# Patient Record
Sex: FEMALE | Race: WHITE
[De-identification: names, ages, dates, MRNs, and addresses within clinical notes are randomized per-mention and may not be internally consistent; named-entity substitution may affect disease eponyms.]

---

## 2018-02-01 ENCOUNTER — HOSPITAL ENCOUNTER (OUTPATIENT)
Dept: HOSPITAL 17 - HDIC | Age: 78
Discharge: HOME | End: 2018-02-01
Attending: INTERNAL MEDICINE
Payer: MEDICARE

## 2018-02-01 VITALS — WEIGHT: 163.8 LBS | HEIGHT: 63 IN | BODY MASS INDEX: 29.02 KG/M2

## 2018-02-01 VITALS
HEART RATE: 52 BPM | DIASTOLIC BLOOD PRESSURE: 63 MMHG | RESPIRATION RATE: 18 BRPM | OXYGEN SATURATION: 96 % | SYSTOLIC BLOOD PRESSURE: 131 MMHG | TEMPERATURE: 97.8 F

## 2018-02-01 DIAGNOSIS — I25.10: ICD-10-CM

## 2018-02-01 DIAGNOSIS — I10: ICD-10-CM

## 2018-02-01 DIAGNOSIS — I70.0: Primary | ICD-10-CM

## 2018-02-01 PROCEDURE — 82810 BLOOD GASES O2 SAT ONLY: CPT

## 2018-02-01 PROCEDURE — 99153 MOD SED SAME PHYS/QHP EA: CPT

## 2018-02-01 PROCEDURE — C1893 INTRO/SHEATH, FIXED,NON-PEEL: HCPCS

## 2018-02-01 PROCEDURE — 93456 R HRT CORONARY ARTERY ANGIO: CPT

## 2018-02-01 PROCEDURE — 99152 MOD SED SAME PHYS/QHP 5/>YRS: CPT

## 2018-02-01 PROCEDURE — C1769 GUIDE WIRE: HCPCS

## 2018-02-01 NOTE — CATHPROC
[facility] HIS Report

Study Information

Study Number    Admission          Scheduled Start             Study Start

 

[id number]    Feb 1 2018 6:44AM      02/01/2018 Feb 1 2018 8:30AM

 

Universal Service

 

Cardiac Catheterization

 

Admit Source               Facility Department

 

Other                  [facility] - Cath Lab

 

Physician and Clinical Staff

Initial MD Britton, Mauriloi         Circulator     Katerina Cerda,FRANK

 

                         Other        cathlab, cathlab

 

                         Recorder      Dorie Mark,RRT TECH2

 

                         Scrub        Maurilio Carmichael,RT(R)

 

Procedures Performed

Procedure                    Location (Site)            Vessel Name

 

Coronary Angiograms                LCA                 Left Coronary

Coronary Angiograms                RCA                 Right Coronary



Equipment

Time           Description           Size          Mfg Part Number  Used/Scraped

                                               [id number]

08:40   SOL CHRISTINA       SWAN MICHAEL CATHETER        FR 7                   Used

                                               *[id number]

                   TRANSDUCER, TRUWAVE                   [id number]

08:40   SOL CHRISTINA                        *                     Used

                   W/STOCKCOCK                       *[id number]

                   TRANSDUCER, TRUWAVE                   [id number]

08:40   SOL CHRISTINA                        *                     Used

                   W/STOCKCOCK                       *[id number]

                                               [phone]



                                               *[id number]

                                               [phone]



                                               *[id number]

                                               538-453S



                                               *[id number]

                                               [id number]

08:40   [employer]     PACK, CCL CUSTOM         *                     Used

                                               *[id number]

                                               [id number]

08:40   [employer]ER        PEN, SKIN DUAL W/ RULER     *                     Used

                                               *[id number]

                                               [id number]

09:19   [employer]        SHEATH, FR4.5 PRELUDE 11CM    FR 4.5                  Used

                                               [id number]

                                               [id number]

08:40   [employer]        WIRE, 3MMJ .035 180CM      180CM                   Used

                                               *[id number]

                                               [id number]

08:40   NAMIC            MANIFOLD, 2 PORT         *                     Used

                                               *[id number]

                                               [id number]

08:40   NAMIC            MANIFOLD, 4 PORT         *                     Used

                                               *[id number]

08:40   [employer]           OMNIPAQUE, 350 MG, 150ML     150ML         [id number]      Used

                                               [id number]

08:40   [employer]        BLANKET,WARM AIR CCL       *                     Used

                                               *[id number]

                                               [id number]

08:40   [employer]       SHEATH, FR7 TERUMO (10CM)    FR 7                   Used

                                               *[id number]

 

History: Allergies

Allergy              Reaction

 

magnesium             Rash

 

magnesium oxide          Rash

 

magnesium sulfate         Rash

 

magnesium hydroxide        Rash

 

 

History: Risk Factors

                     Family History of

Hypertension    Dyslipidemia               Previous MI    Previous Heart Failure

                     Premature CAD

Yes         Yes          No         No        No

Prior Valve

          Prior PCI       Prior CABG

Surgery

No         No          No

         Cerebrovascular     Peripheral Artery  Chronic Lung

On Dialysis                                   Diabetes   Diabetes Therapy

         Disease         Disease       Disease

No        No           No         No        Yes      Oral

 

 

History: Stress Tests

Stress or Imaging Studies Performed

 

No

History: Other

Current Smoker     Quit             Packs a Day       Years Used         Pack Years

 

No           45 Years Ago         1            30             30

 

Labs

Hgb (g/dl)       Hct (%)         WBC (l/cumm)        Platelets (thousands)

 

11.60-17.00       35.00-51.00       4.00-11.00         150..00

 

11.3          34.2           12.9            163

 

Glucose (mg/dl)     BUN (mg/dl)       Creatinine (mg/dl)    BUN:Creatinine (1:x)

74..00      7.00-18.00        0.50-1.30        10.00-20.00

 

149           7            0.5           14

 

Na (meq/l)       K (meq/l)

 

136..00      3.50-5.10

 

143           3.8

 

 

 

 

Medication

Medication Total Dose (Bolus/Oral)

Medication              Total Dosage/Unit

 

1% XYLOCAINE                30 mL

 

FENTANYL                  75 mcg

 

OXYGEN                   2 l/min

 

VERSED                   2 mg

 

Medications (Bolus/Oral)

Medication           Time Given         Dosage/Unit       Administered By       Reason

 

VERSED             2/1/2018 9:01:28 AM      2 mg         Katerina Cerda

2 mg VERSED given in lab by Katerina Cerda RN in Left Wrist via Peripheral IV. Ordered by Maurilio Ramírez.

 

FENTANYL            2/1/2018 9:02:37 AM      25 mcg         Katerina Cerda

25 mcg FENTANYL given in lab by Katerina Cerda RN in Left Wrist via Peripheral IV. Ordered by Maurilio Slade.

 

1% XYLOCAINE          2/1/2018 9:05:09 AM      20 mL         Maurilio Britton

20 mL 1% XYLOCAINE given in lab by Maurilio Britton in Right Groin via Subcutaneous. Ordered by Maurilio Slade.

 

FENTANYL            2/1/2018 9:19:28 AM      25 mcg         Katerina Cerda

25 mcg FENTANYL given in lab by Katerina Cerda RN in Left Wrist via Peripheral IV. Ordered by Maurilio Slade.

 

1% XYLOCAINE          2/1/2018 9:29:54 AM      10 mL         Maurilio Britton

10 mL 1% XYLOCAINE given in lab by Maurilio Britton in Left Groin via Subcutaneous. Ordered by Maurilio Hoang.

 

FENTANYL            2/1/2018 9:46:00 AM      25 mcg         Katerina Cerda

25 mcg FENTANYL given in lab by Katerina Cerda, RN in Left Wrist via Peripheral IV. Ordered by Maurilio Slade.

 

OXYGEN             2/1/2018 9:53:06 AM      2 l/min        Katerina Cerda

2 l/min OXYGEN given in lab by Katerina Cerda, RN via Nasal. Ordered by Maurilio Britton.

Medication (Drip)

Medication           Time Given          Dosage/Unit       Concentration/Unit  Diluent (ml)  Solution


 

IV Solutions          2/1/2018 8:40:50 AM       0 mL (IV)                 500       NaCl .9

IV Solutions given in lab by cathlab cathlab in Left Wrist via Peripheral IV. Pump/Drip Flow = 100 m
l/hr using NaCl .9. Ordered by Maurilio Britton.

 

 

Initial Case Assessment

Cardiovascular

HR             NIBP

 

59             157/69

 

Edema Present        Skin color             Skin

 

None             Normal               Warm Dry

 

Circulatory - Right Pulses

Dorsalis Pedis       Femoral

 

1              2

 

Scale (0,1,2,3,4,d)

 

Circulatory - Left Pulses

Dorsalis Pedis       Femoral

 

1              2

 

Scale (0,1,2,3,4,d)

 

Neurological State

              Oriented to time-place-

Alert                        Moves all extremities

              person

 

Respiration - General

Respiration Rate

              SpO2 (%)

(B/min)

14             96

Final Case Assessment

Cardiovascular

HR           NIBP

 

66           148/63

 

Edema Present      Skin color           Skin

 

None           Normal             Warm Dry

 

Circulatory - Right Pulses

Dorsalis Pedis     Femoral

 

1            2

 

Scale (0,1,2,3,4,d)

 

Circulatory - Left Pulses

Dorsalis Pedis     Femoral

 

1            2

 

Scale (0,1,2,3,4,d)

 

Neurological State

            Oriented to time-place-

Alert                      Moves all extremities

            person

 

Respiration - General

Respiration Rate

            SpO2 (%)         O2 (lpm)

(B/min)

14           93            2

 

Chronological Log

Time    Study Chronological Log

 

8:30:15   Patient arrived via Bed.

 

8:30:20   Patient Name, D.O.B, / Armband Verified By R.N.

 

8:30:21   Consent signed by the physician and the patient and verified by the Cath Lab staff.

 

8:30:22   Pre-op and post- op instructions given; patient acknowledges understanding of instructions.


 

8:30:24   Patient has been NPO for More than 6Hrs.

 

8:30:25   NO Skin Breakdown-

 

8:30:26   Patient Warmer Placed on the Table.

      Vitals capture started with the following parameters, Patient=Adult, Interval=5 min, Initial Pr
hvvehe=121 mmHg,

8:38:55

      Deflation Rate=5 mmHg, Cuff placed on Right Arm

8:39:11   Reference ECG taken

 

8:39:39   HR=59 bpm, YMTH=458/69 mmhg, SpO2=96.0 %, Resp=14 B/min, Pain=0, Desiree=10, Feliciano=2

 

8:40:48   Yola Prominences Protected

 

8:40:48   A # 20 IV was noted in the Wrist (left). Grade = 0

      IV Solutions given in lab by cathlab, cathlab in Left Wrist via Peripheral IV. Pump/Drip Flow =
 100 ml/hr using NaCl .9.

8:40:50

      Ordered by Maurilio Britton.

8:40:53   History and physical on the chart or being dictated.

      Assessment: Initial Case, HR=59 BPM, CECE=978/69 mmhg, Edema=None, Color=Normal, Skin = Warm, D
ry

      Right Pulses: Luther Ped=1, Femoral=2

8:40:54   Left Pulses: Luther Ped=1, Femoral=2

      Neurological: State=Alert, Ox3, MANNING

      Respiration: Resp=14 B/min, SpO2=96 %

8:44:40  HR=58 bpm, QHUK=441/62 mmhg, SpO2=94.0 %, Resp=15 B/min, Pain=0, Desiree=10, Feliciano=2

 

8:49:33  HR=59 bpm, GHFR=597/85 mmhg, SpO2=94.0 %, Resp=11 B/min, Pain=0, Desiree=10, Feliciano=2

 

8:53:46  Bilateral groins prepped with 2% chlorhexidine, and draped after a 3 minute waiting time.

 

8:55:09  HR=59 bpm, SMFF=740/68 mmhg, SpO2=93.0 %, Resp=11 B/min, Pain=0, Desiree=10, Feliciano=2

 

8:56:24  Pressure channel 1 zeroed.

 

8:56:43  Pressure channel 2 zeroed.

 

9:00:06  HR=65 bpm, OZVR=372/70 mmhg, SpO2=93.0 %, Resp=19 B/min, Pain=0, Desiree=10, Feliciano=2

     Time Out. Correct patient, correct procedure, correct physician, power injector not loaded with 
contrast with surgical

9:00:16

     team present. Time Out Concurred by MD and individual staff in procedure.

     Time Out #2 - Consents verified, patient in correct position, all results are labled and display
ed, safety precautions

9:00:42

     taken. Time Out concurred by MD, individual staff in procedure.

9:01:28  2 mg VERSED given in lab by Katerina Cerda RN in Left Wrist via Peripheral IV. Ordered by 
Maurilio Britton.

 

9:01:54  Case Start

 

9:02:37  25 mcg FENTANYL given in lab by Katerina Cerda RN in Left Wrist via Peripheral IV. Ordered
 by Maurilio Britton.

 

9:04:36  HR=66 bpm, RDEW=475/65 mmhg, SpO2=89 %, Resp=12 B/min, Pain=0, Desiree=10, Feliciano=2

 

9:05:09  20 mL 1% XYLOCAINE given in lab by Maurilio Britton in Right Groin via Subcutaneous. Ordered
 by Marcella Britton

 

9:10:12  HR=64 bpm, CFQY=355/65 mmhg, SpO2=91 %, Resp=15 B/min, Pain=0, Desiree=10, Feliciano=2

 

9:14:38  HR=66 bpm, IDDW=151/68 mmhg, SpO2=93.0 %, Resp=17 B/min, Pain=0, Desiree=10, Feliciano=2

 

9:18:22  Access site was Right Femoral Artery.

 

9:18:29  A SHEATH, FR4.5 PRELUDE 11CM FR 4.5 was advanced into the Fem Art (right) using the Modified
 Seldinger technique.

 

9:19:28  25 mcg FENTANYL given in lab by Katerina Cerda RN in Left Wrist via Peripheral IV. Ordered
 by Maurilio Britton.

 

9:19:37  HR=66 bpm, QNKV=020/78 mmhg, SpO2=92.0 %, Resp=16 B/min, Pain=0, Desiree=10, Feliciano=2

 

9:23:14  DR. SAM IS USING ULTRASOUND TO FIND RFV

 

9:24:39  HR=65 bpm, BFTU=872/74 mmhg, SpO2=85.0 %, Resp=15 B/min, Pain=0, Desiree=10, Feliciano=2

 

9:29:54  10 mL 1% XYLOCAINE given in lab by Maurilio Britton in Left Groin via Subcutaneous. Ordered 
by Maurilio Britton.

 

9:30:14  HR=65 bpm, ODHF=627/67 mmhg, SpO2=92.0 %, Resp=14 B/min, Pain=0, Desiree=10, Feliciano=2

 

9:32:27  Access site was Left Femoral Vein.

 

9:32:36  A SHEATH, FR7 TERUMO (10CM) FR 7 was advanced into the Fem Vein (left) using the Modified Se call technique.

 

9:34:07  A SWAN MICHAEL CATHETER FR 7 was inserted via Fem Vein (left)

 

9:34:43  HR=68 bpm, IKSL=008/65 mmhg, SpO2=90.0 %, Resp=12 B/min, Pain=0, Desiree=10, Feliciano=2

 

9:36:23  Pressure channel 2 zeroed.

 

9:36:37  Pressure channel 2 zeroed.

 

9:38:23  Pressure channel 2 zeroed.

     Recorded Pressure: RA, HR=65, Condition=Condition 1

9:39:03

     (Right Atrium) RA 12/11/8

9:39:09  Pressure channel 1 zeroed.

 

9:39:42  HR=63 bpm, XMLB=091/66 mmhg, SpO2=92.0 %, Resp=14 B/min, Pain=0, Desiree=10, Feliciano=2

     Recorded Pressure: RV, HR=63, Condition=Condition 1

9:40:05

     (Right Ventricle) RV 39/4/10

     Recorded Pressure: PCW, HR=63, Condition=Condition 1

9:42:29

     (Pulmonary Capillary Wedge) PCW 19/26/15

     Recorded Pressure: MPA, HR=63, Condition=Condition 1

9:43:11

     (Main Pulmonary Artery) MPA 39/18/28

      Thermo CO: CO=5.3 l/m, HR=66 bpm, Condition=Condition 1. Used in calculation.

9:44:11  Equipment: Description and Size=SWAN MICHAEL CATHETER FR 7, Type=Bath Probe, CC=0.579

      Injectant: Temp=19.0 - 22.0 Celsius, Volume=10.0 ml

      Thermo CO: CO=4.7 l/m, HR=64 bpm, Condition=Condition 1. Used in calculation.

9:44:46  Equipment: Description and Size=SWAN MICHAEL CATHETER FR 7, Type=Bath Probe, CC=0.579

      Injectant: Temp=19.0 - 22.0 Celsius, Volume=10.0 ml

9:45:07  HR=64 bpm, UICW=871/70 mmhg, SpO2=92.0 %, Resp=13 B/min, Pain=0, Desiree=10, Feliciano=2

      Thermo CO: CO=5.1 l/m, HR=67 bpm, Condition=Condition 1. Used in calculation.

9:45:17  Equipment: Description and Size=SWAN MICHAEL CATHETER FR 7, Type=Bath Probe, CC=0.579

      Injectant: Temp=19.0 - 22.0 Celsius, Volume=10.0 ml

9:46:00  25 mcg FENTANYL given in lab by Katerina Cerda RN in Left Wrist via Peripheral IV. Ordered
 by Maurilio Britton.

 

9:46:26  Saturation: Site=FA (Femoral Artery) , O2=93.8 %, Hgb=11.3 gm/dl, Condition=Condition 1. Use
d in calculation.

 

9:47:59  Saturation: Site=PA (Pulmonary Artery) , O2=11.3 %, Hgb=72.2 gm/dl, Condition=Condition 1. U
sed in calculation.

 

9:48:39  Saturation: Site=RV (Right Ventricle) , O2=70.7 %, Hgb=11.3 gm/dl, Condition=Condition 1. Us
ed in calculation.

 

9:49:25  Saturation: Site=RAhi (High Right Atrium) , O2=70.4 %, Hgb=11.3 gm/dl, Condition=Condition 1
. Used in calculation.

 

9:50:17  HR=60 bpm, QUFC=045/66 mmhg, SpO2=88.0 %, Resp=11 B/min, Pain=0, Desiree=10, Feliciano=2

 

9:50:46  Saturation: Site=Deborah (Mid Right Atrium) , O2=69.5 %, Hgb=11.3 gm/dl, Condition=Condition 1. 
Used in calculation.

 

9:51:30  Saturation: Site=RAlo (Low Right Atrium) , O2=67.9 %, Hgb=11.3 gm/dl, Condition=Condition 1.
 Used in calculation.

 

9:53:00  Harris Michael Catheter Removed

 

9:53:06  2 l/min OXYGEN given in lab by Katerina Cerda RN via Nasal. Ordered by Maurilio Britton.

      A JL 4.0 INFINITI CATHETER FR 4 was advanced over a wire. OMNIPAQUE, 350 MG, 150ML 150ML was us
ed for

9:53:21

      injections.

9:54:43  HR=60 bpm, FLTM=955/59 mmhg, SpO2=90.0 %, Resp=14 B/min, Pain=0, Desiree=10, Feliciano=2

      Recorded Pressure: Ao, HR=61, Condition=Condition 1

9:55:45

      (Aorta) Ao 129/57/86

9:56:11  The  LCA was injected and visualized at various angles. OMNIPAQUE, 350 MG, 150ML 150ML used.


 

9:59:40  HR=66 bpm, GJYH=529/66 mmhg, SpO2=93.0 %, Resp=14 B/min, Pain=0, Desiree=10, Feliciano=2

 

9:59:45  Catheter was removed

      A 3DRC INFINITI CATHETER FR 4 was advanced over a wire. OMNIPAQUE, 350 MG, 150ML 150ML was used
 for

9:59:47

      injections.

10:03:18  The  RCA was injected and visualized at various angles. OMNIPAQUE, 350 MG, 150ML 150ML used
.

 

10:04:41  HR=64 bpm, VHTP=714/63 mmhg, SpO2=91.0 %, Resp=14 B/min, Pain=0, Desiree=10, Feliciano=2

 

10:05:55  Catheter was removed

 

10:06:08  An injection in the Fem Art (right) was made through the SHEATH, FR4.5 PRELUDE 11CM FR 4.5.


 

10:08:24  Catheter(s) removed without difficulty

 

10:08:33  Sheath(s) left in place, will be removed in Holding Area

 

10:08:42  Case End

 

10:10:15  HR=66 bpm, RACN=457/65 mmhg, SpO2=93.0 %, Resp=14 B/min, Pain=0, Desiree=10, Feliciano=2

 

10:12:16  Sterile dressing applied to site

 

10:12:17  No case complications noted.

 

10:14:45  HR=66 bpm, HFCD=670/63 mmhg, SpO2=93.0 %, Resp=14 B/min, Pain=0, Desiree=10, Feliciano=2

 

10:14:55  Vitals capture stopped.

      Assessment: Final Case, HR=66 BPM, SDOW=172/63 mmhg, Edema=None, Color=Normal, Skin = Warm, Dry


      Right Pulses: Luther Ped=1, Femoral=2

10:15:26  Left Pulses: Luther Ped=1, Femoral=2

      Neurological: State=Alert, Ox3, MANNING

      Respiration: Resp=14 B/min, SpO2=93 %, O2=2 lpm

10:16:08   No case complications noted.

 

10:16:09   Cine recording checked.

 

10:16:10   Bedside Report will be given.

 

10:16:14   Contrast Scanned

 

10:16:17   A Left and Right Heart Cath was performed.

 

 

 

 

End Study - Contrast Media Used In Study

Contrast       Total Opened (mL)    Total Used (mL)  Total Wasted (mL)

 

Omnipaque       50           50        0

 

End Study - Maximum Contrast Load

Max Contrast Load (mL)

 

743.2

 

End Study - Radiation Exposure

Fluoro Time

(minutes)

12.5

 

 

End Study - Patient Disposition

Complications     Transferred To

 

           Telemetry Bed

## 2018-02-02 NOTE — MA
cc:

NEREYDA WOOTEN M.D.FLAVIA DO

****

 

 

DATE

2/1/2018

 

PROCEDURE PERFORMED

Right heart catheterization.  Left heart catheterization.  Coronary

arteriography.  Right femoral angiography.

 

PROCEDURE TECHNIQUE

The patient was brought to the cardiac catheterization laboratory and the area

of the right and left groins were prepped and draped in the usual sterile

manner.  Following 50 mL of 1% Xylocaine for local anesthesia in the right

groin, a 4-Tanzanian short introducer sheath was placed in the right femoral

artery via the modified Seldinger technique.  A 7-Tanzanian short introducer

sheath was placed in the left femoral vein because of access in the right

femoral vein.  Through the venous introducer sheath, a 7-Tanzanian [place]-Rubi

thermodilution catheter was past to the right heart and pressures measured in

the right atrium, right ventricle, pulmonary artery and pulmonary capillary

wedge positions.  Triplicate thermodilution cardiac outputs were performed and

following oxygen saturations drawn from the pulmonary artery, right ventricle,

high mid and low right atrium and arterial system, the catheter was removed.

The left heart study was performed with the use of a 4-Tanzanian JL-4 and 3DRC

catheter.  Multiple projections of the left and right coronary arteries were

taken and a left ventricular was not performed due to severe aortic stenosis

based upon recent echocardiography.

 

HEMODYNAMIC RESULTS

Right heart pressures:

Right atrium A-wave equals 12, V-wave equals 11, mean equals 8 mmHg.

 

Right ventricle 39/10 mmHg.

Pulmonary artery 39/80 mmHg with a mean pulmonary pressure 28 mmHg.

 

Pulmonary capillary wedge pressure A-wave equals 19, V-wave equals 26, mean

equals 15 mmHg.

 

The aortic valve was not crossed.

 

 

The cardiac output by thermodilution 5.0 liters per minute, index 2.8 liters

per minute/meter squared.

 

The cardiac output by Sasha was inaccurate.

 

Oxygen saturations did not show any evidence of intracardiac shunting or step

up.

 

For complete hemodynamic details, please see accompanying paperwork.

 

ANGIOGRAPHIC FINDINGS

LEFT VENTRICLE

Not performed.

 

LEFT CORONARY ARTERY

The left coronary arises normally from the left coronary sinus.

 

LEFT MAIN ARTERY

The left main artery is large in caliber and has some mild calcifications.

There is some eccentric ostial narrowing of approximately 40-45%.  There is

some 20-30% distal tapering.

 

LEFT ANTERIOR DESCENDING ARTERY

A  moderate-to-large caliber vessel.  Gives rise to one major diagonal branch

and multiple septal perforating branches.  The LAD course is around the apex.

There are mild luminal irregularities throughout with areas up to 10-15%

narrowing.  Diagonal branch is moderate in caliber with a 10-15% proximal

stenosis.

 

CIRCUMFLEX

Nondominant.  Large-caliber vessel.  15-20% proximal stenosis, some mild

ectasia throughout the mid segment gives rise to a small lateral branch and a

large posterolateral branch.  The posterolateral branch has only mild luminal

irregularities.

 

RIGHT CORONARY ARTERY

Dominant.  The right coronary is large in caliber and gives rise to a right

ventricular marginal branch, posterior descending branch and two posterior

ventricular branches.  The right coronary artery has mild luminal

irregularities in the mid segment.  The RV branch is small in caliber and

normal.  The posterior descending branch is moderate in caliber with mild

irregularities.  The posterior ventricular branches are moderate in caliber.

The first posterior ventricular branch is small in

 

 

caliber.  The second posterior ventricular most distal branch is large in

caliber and normal.

 

DIAGNOSIS

1. Normal right heart pressures.

2. Mild coronary atherosclerosis with 40-45% ostial left main stenosis.

3. Normal left ventricular function based upon recent noninvasive studies.

4. Severe calcific aortic stenosis.

 

COMMENT/RECOMMENDATIONS

Angiographically, this patient does not have any hemodynamically significant

epicardial coronary artery disease.  She will be discharged home later today

for further followup with surgery and interventional cardiology for

consideration of TAVR.

 

 

 

 

 

                              _________________________________

                              MD JUDE Azevedo/SCOTT

D:  2/1/2018/10:18 AM

T:  2/2/2018/11:17 AM

Visit #:  [id number]

Job #:  [id number]

## 2018-02-21 ENCOUNTER — HOSPITAL ENCOUNTER (OUTPATIENT)
Dept: HOSPITAL 17 - HRSP | Age: 78
End: 2018-02-21
Attending: INTERNAL MEDICINE
Payer: MEDICARE

## 2018-02-21 DIAGNOSIS — R06.02: Primary | ICD-10-CM

## 2018-02-21 PROCEDURE — 94726 PLETHYSMOGRAPHY LUNG VOLUMES: CPT

## 2018-02-21 PROCEDURE — 94060 EVALUATION OF WHEEZING: CPT

## 2018-02-21 PROCEDURE — 82805 BLOOD GASES W/O2 SATURATION: CPT

## 2018-02-21 PROCEDURE — 94729 DIFFUSING CAPACITY: CPT

## 2018-02-21 PROCEDURE — 36600 WITHDRAWAL OF ARTERIAL BLOOD: CPT

## 2018-02-26 NOTE — RSPPFT
DATE OF PROCEDURE:   2/21/18



COMMENTS:   



Spirometry with FVC of 2.4, FEV1 of 1.6, FEV1/FVC ratio at 68%. A non-significant response 
to acutely inhaled bronchodilator noted. Slow vital capacity is 72%. TLC is 75%. Diffusion 
capacity is 63% of predicted. Room air arterial blood gases show pH of 7.48, PCO2 of 37, 
PO2 of 112.



IMPRESSION:    

   

1.    Moderately severe airways obstruction.

2.    Associated restrictive component.

3.    Moderate reduction in diffusion capacity.

4.    Non-significant response to inhaled bronchodilator.

5.    Adequate oxygenation and alveolar ventilation.